# Patient Record
(demographics unavailable — no encounter records)

---

## 2018-09-07 NOTE — DIAGNOSTIC IMAGING REPORT
PROCEDURE: US Non-ob pelvis comp/trans.



TECHNIQUE: Multiple realtime grayscale images were obtained of

the pelvis in various projections endovaginally.



Transabdominal imaging was also performed.



INDICATION: Left lower quadrant pain.



FINDINGS: The uterus measures 7.7 x 4.0 x 3.1 cm. Endometrium is

3 mm in thickness. No myometrial mass is seen. The right ovary

measures 3.2 x 2.3 x 2.0 cm and the left ovary measures 3.6 x 2.1

x 2.3 cm. There is blood flow to both ovaries. No adnexal mass is

seen. Small amount of free fluid is present, likely physiologic.



IMPRESSION: Unremarkable pelvic ultrasound.



Dictated by: 



  Dictated on workstation # [id number]